# Patient Record
(demographics unavailable — no encounter records)

---

## 2025-05-30 NOTE — ASSESSMENT
[FreeTextEntry1] : This is a 69 y/o F (Slovak/Kinyarwanda speaking) with PMH of CAD s/p STEMI with COOPER (mLAD 100% obstruction 05/14/25) requiring IABP (for hypotension and hypoxia), HFrEF (LVEF 40-45% TTE 05/17/2025), tobacco use disorder (current smoker), COPD, HTN, HLD, T2DM, breast ca s/p lumpectomy 3 yrs ago who is presenting for initial cardiology visit after [facility] admission with d/c 05/19/25. Patient will be going back to [facility] where she will continue to get the rest of her care.   Will continue to uptitrate GDMT to maximally tolerated doses and will have patient follow-up with cardiology prior to trip back to [facility] where she will get the rest of her care.   #CAD s/p COOPER (LAD) Requiring IABP: #HFrEF (LVEF 40-45% TTE 05/17/2025): - TTE 05/17/25 with LVEF 40-45% and rWMAs with mid anteroseptal, inferior septum, basal anteroseptum, and apex abnormal.  [] Cont ASA 81mg daily [] Cont Plavix 75mg daily for 12 months (end 05/2026)- emphasized importance of not missing even one dose of DAPT  [] Cont losartan 25mg daily-> can consider increasing next visit  [] Increase metop succ to 50mg daily 05/29/25 (previously on 25mg daily) [] Cont spironolactone 25mg daily [] Increase dapagliflozin 5mg to 10mg for HFrEF 05/29/25 (pt previously already on 5mg from [facility])  [] F/u with Cardiology in 1 month (06/2025); will email letter to daughter stating pt has health condition and needs to delay flight home [] Next TTE in 3 months from 05/2025 when she gets home to [facility]  [] Pt will plan for cardiac rehab when back in [facility]  #HTN: [] Cont losartan, spironolactone, and metop as above  #HLD:  - Lipid panel 05/14/25: (H), HDL 46(L), Triglyc 154, Tot Chol 177 [] Cont atorva 80mg daily  #T2DM:  [] Mgmt as per PCP, on insulin and Ozempic  #Tobacco Use Disorder:  [] Encourage smoking cessation   Patient discussed with Dr. Matthews. F/u visit in 1 month. Cont to uptitrate GDMT and monitor for symptoms.  Beka Nelsno MD  Cardiology Fellow

## 2025-05-30 NOTE — ASSESSMENT
[FreeTextEntry1] : This is a 71 y/o F (Frisian/Syriac speaking) with PMH of CAD s/p STEMI with COOPER (mLAD 100% obstruction 05/14/25) requiring IABP (for hypotension and hypoxia), HFrEF (LVEF 40-45% TTE 05/17/2025), tobacco use disorder (current smoker), COPD, HTN, HLD, T2DM, breast ca s/p lumpectomy 3 yrs ago who is presenting for initial cardiology visit after [facility] admission with d/c 05/19/25. Patient will be going back to [facility] where she will continue to get the rest of her care.   Will continue to uptitrate GDMT to maximally tolerated doses and will have patient follow-up with cardiology prior to trip back to [facility] where she will get the rest of her care.   #CAD s/p COOPER (LAD) Requiring IABP: #HFrEF (LVEF 40-45% TTE 05/17/2025): - TTE 05/17/25 with LVEF 40-45% and rWMAs with mid anteroseptal, inferior septum, basal anteroseptum, and apex abnormal.  [] Cont ASA 81mg daily [] Cont Plavix 75mg daily for 12 months (end 05/2026)- emphasized importance of not missing even one dose of DAPT  [] Cont losartan 25mg daily-> can consider increasing next visit  [] Increase metop succ to 50mg daily 05/29/25 (previously on 25mg daily) [] Cont spironolactone 25mg daily [] Increase dapagliflozin 5mg to 10mg for HFrEF 05/29/25 (pt previously already on 5mg from [facility])  [] F/u with Cardiology in 1 month (06/2025); will email letter to daughter stating pt has health condition and needs to delay flight home [] Next TTE in 3 months from 05/2025 when she gets home to [facility]  [] Pt will plan for cardiac rehab when back in [facility]  #HTN: [] Cont losartan, spironolactone, and metop as above  #HLD:  - Lipid panel 05/14/25: (H), HDL 46(L), Triglyc 154, Tot Chol 177 [] Cont atorva 80mg daily  #T2DM:  [] Mgmt as per PCP, on insulin and Ozempic  #Tobacco Use Disorder:  [] Encourage smoking cessation   Patient discussed with Dr. Matthews. F/u visit in 1 month. Cont to uptitrate GDMT and monitor for symptoms.  Beka Nelson MD  Cardiology Fellow

## 2025-05-30 NOTE — CARDIOLOGY SUMMARY
[de-identified] : 05/29/25: TWI V2-V3, q waves inferiorly [de-identified] : TTE 05/17/25 with LVEF 40-45% and rWMAs with mid anteroseptal, inferior septum, basal anteroseptum, and apex abnormal. [de-identified] : 05/14/25 with COOPER mLAD 100% and requiring IABP

## 2025-05-30 NOTE — CARDIOLOGY SUMMARY
[de-identified] : 05/29/25: TWI V2-V3, q waves inferiorly [de-identified] : TTE 05/17/25 with LVEF 40-45% and rWMAs with mid anteroseptal, inferior septum, basal anteroseptum, and apex abnormal. [de-identified] : 05/14/25 with COOPER mLAD 100% and requiring IABP

## 2025-05-30 NOTE — ASSESSMENT
[FreeTextEntry1] : This is a 71 y/o F (Welsh/Irish speaking) with PMH of CAD s/p STEMI with COOPER (mLAD 100% obstruction 05/14/25) requiring IABP (for hypotension and hypoxia), HFrEF (LVEF 40-45% TTE 05/17/2025), tobacco use disorder (current smoker), COPD, HTN, HLD, T2DM, breast ca s/p lumpectomy 3 yrs ago who is presenting for initial cardiology visit after [facility] admission with d/c 05/19/25. Patient will be going back to [facility] where she will continue to get the rest of her care.   Will continue to uptitrate GDMT to maximally tolerated doses and will have patient follow-up with cardiology prior to trip back to [facility] where she will get the rest of her care.   #CAD s/p COOPER (LAD) Requiring IABP: #HFrEF (LVEF 40-45% TTE 05/17/2025): - TTE 05/17/25 with LVEF 40-45% and rWMAs with mid anteroseptal, inferior septum, basal anteroseptum, and apex abnormal.  [] Cont ASA 81mg daily [] Cont Plavix 75mg daily for 12 months (end 05/2026)- emphasized importance of not missing even one dose of DAPT  [] Cont losartan 25mg daily-> can consider increasing next visit  [] Increase metop succ to 50mg daily 05/29/25 (previously on 25mg daily) [] Cont spironolactone 25mg daily [] Increase dapagliflozin 5mg to 10mg for HFrEF 05/29/25 (pt previously already on 5mg from [facility])  [] F/u with Cardiology in 1 month (06/2025); will email letter to daughter stating pt has health condition and needs to delay flight home [] Next TTE in 3 months from 05/2025 when she gets home to [facility]  [] Pt will plan for cardiac rehab when back in [facility]  #HTN: [] Cont losartan, spironolactone, and metop as above  #HLD:  - Lipid panel 05/14/25: (H), HDL 46(L), Triglyc 154, Tot Chol 177 [] Cont atorva 80mg daily  #T2DM:  [] Mgmt as per PCP, on insulin and Ozempic  #Tobacco Use Disorder:  [] Encourage smoking cessation   Patient discussed with Dr. Matthews. F/u visit in 1 month. Cont to uptitrate GDMT and monitor for symptoms.  Beka Nelson MD  Cardiology Fellow

## 2025-05-30 NOTE — HISTORY OF PRESENT ILLNESS
[FreeTextEntry1] : This is a 71 y/o F (Swedish/Chinese speaking) with PMH of CAD s/p STEMI with COOPER (mLAD 100% obstruction 05/14/25) requiring IABP (for hypotension and hypoxia), HFrEF (LVEF 40-45% TTE 05/17/2025), tobacco use disorder (current smoker), COPD, HTN, HLD, T2DM, breast ca s/p lumpectomy 3 yrs ago who is presenting for initial cardiology [facility]. Patient will be going back to [facility] where she will continue to get the rest of her care.   05/29/25:  - Pt present with daughter and  - Doing well since STEMI, says has intermittent chest tightness that resolves on its own, 5/10 in severity. Has been walking and exercising. She walks in backyard few rounds but otherwise no significant exercise. Gets fatigued. Discussed importance of exercise and movement for rehabilitation - Compliant with all medications  - Lives in [facility], visiting her daughter; was planning to go back 06/09/2025 but pt will stay another month at least to monitor symptoms and to f/u with us in cardiology - Discussed importance of visiting cardiologist when she does go back; she has a doctor she will schedule f/u with  - Uptitrate GDMT; increased dapagliflozin to 10mg and metop succ 50mg from 25mg prior - Discuss tobacco cessation; working on quitting using patches that she has - Wants a letter for her flight company to delay her trip back  Last Labs:  CBC 05/19/25: WBC 7, Hgb 14.8, Plt 289 CMP: K 3.6, Mg 1.9, BUN 33, Cr 0.70, ALT 35(H), AST 19, Alk Phos 112

## 2025-05-30 NOTE — CARDIOLOGY SUMMARY
[de-identified] : 05/29/25: TWI V2-V3, q waves inferiorly [de-identified] : TTE 05/17/25 with LVEF 40-45% and rWMAs with mid anteroseptal, inferior septum, basal anteroseptum, and apex abnormal. [de-identified] : 05/14/25 with COOPER mLAD 100% and requiring IABP

## 2025-05-30 NOTE — HISTORY OF PRESENT ILLNESS
[FreeTextEntry1] : This is a 71 y/o F (English/French speaking) with PMH of CAD s/p STEMI with COOPER (mLAD 100% obstruction 05/14/25) requiring IABP (for hypotension and hypoxia), HFrEF (LVEF 40-45% TTE 05/17/2025), tobacco use disorder (current smoker), COPD, HTN, HLD, T2DM, breast ca s/p lumpectomy 3 yrs ago who is [facility] Patient will be going back to [facility] where she will continue to get the rest of her care.   05/29/25:  - Pt present with daughter and  - Doing well since STEMI, says has intermittent chest tightness that resolves on its own, 5/10 in severity. Has been walking and exercising. She walks in backyard few rounds but otherwise no significant exercise. Gets fatigued. Discussed importance of exercise and movement for rehabilitation - Compliant with all medications  - Lives in [facility], visiting her daughter; was planning to go back 06/09/2025 but pt will stay another month at least to monitor symptoms and to f/u with us in cardiology - Discussed importance of visiting cardiologist when she does go back; she has a doctor she will schedule f/u with  - Uptitrate GDMT; increased dapagliflozin to 10mg and metop succ 50mg from 25mg prior - Discuss tobacco cessation; working on quitting using patches that she has - Wants a letter for her flight company to delay her trip back  Last Labs:  CBC 05/19/25: WBC 7, Hgb 14.8, Plt 289 CMP: K 3.6, Mg 1.9, BUN 33, Cr 0.70, ALT 35(H), AST 19, Alk Phos 112

## 2025-05-30 NOTE — HISTORY OF PRESENT ILLNESS
[FreeTextEntry1] : This is a 69 y/o F (Kyrgyz/Spanish speaking) with PMH of CAD s/p STEMI with COOPER (mLAD 100% obstruction 05/14/25) requiring IABP (for hypotension and hypoxia), HFrEF (LVEF 40-45% TTE 05/17/2025), tobacco use disorder (current smoker), COPD, HTN, HLD, T2DM, breast ca s/p lumpectomy 3 yrs ago who is presenting [facility]. Patient will be going back to [facility] where she will continue to get the rest of her care.   05/29/25:  - Pt present with daughter and  - Doing well since STEMI, says has intermittent chest tightness that resolves on its own, 5/10 in severity. Has been walking and exercising. She walks in backyard few rounds but otherwise no significant exercise. Gets fatigued. Discussed importance of exercise and movement for rehabilitation - Compliant with all medications  - Lives in [facility], visiting her daughter; was planning to go back 06/09/2025 but pt will stay another month at least to monitor symptoms and to f/u with us in cardiology - Discussed importance of visiting cardiologist when she does go back; she has a doctor she will schedule f/u with  - Uptitrate GDMT; increased dapagliflozin to 10mg and metop succ 50mg from 25mg prior - Discuss tobacco cessation; working on quitting using patches that she has - Wants a letter for her flight company to delay her trip back  Last Labs:  CBC 05/19/25: WBC 7, Hgb 14.8, Plt 289 CMP: K 3.6, Mg 1.9, BUN 33, Cr 0.70, ALT 35(H), AST 19, Alk Phos 112

## 2025-05-30 NOTE — HISTORY OF PRESENT ILLNESS
[FreeTextEntry1] : This is a 71 y/o F (Slovenian/Luxembourgish speaking) with PMH of CAD s/p STEMI with COOPER (mLAD 100% obstruction 05/14/25) requiring IABP (for hypotension and hypoxia), HFrEF (LVEF 40-45% TTE 05/17/2025), tobacco use disorder (current smoker), COPD, HTN, HLD, T2DM, breast ca s/p lumpectomy 3 yrs ago who is presenting for [facility]. Patient will be going back to [facility] where she will continue to get the rest of her care.   05/29/25:  - Pt present with daughter and  - Doing well since STEMI, says has intermittent chest tightness that resolves on its own, 5/10 in severity. Has been walking and exercising. She walks in backyard few rounds but otherwise no significant exercise. Gets fatigued. Discussed importance of exercise and movement for rehabilitation - Compliant with all medications  - Lives in [facility], visiting her daughter; was planning to go back 06/09/2025 but pt will stay another month at least to monitor symptoms and to f/u with us in cardiology - Discussed importance of visiting cardiologist when she does go back; she has a doctor she will schedule f/u with  - Uptitrate GDMT; increased dapagliflozin to 10mg and metop succ 50mg from 25mg prior - Discuss tobacco cessation; working on quitting using patches that she has - Wants a letter for her flight company to delay her trip back  Last Labs:  CBC 05/19/25: WBC 7, Hgb 14.8, Plt 289 CMP: K 3.6, Mg 1.9, BUN 33, Cr 0.70, ALT 35(H), AST 19, Alk Phos 112

## 2025-05-30 NOTE — ASSESSMENT
[FreeTextEntry1] : This is a 71 y/o F (Japanese/Yi speaking) with PMH of CAD s/p STEMI with COOPER (mLAD 100% obstruction 05/14/25) requiring IABP (for hypotension and hypoxia), HFrEF (LVEF 40-45% TTE 05/17/2025), tobacco use disorder (current smoker), COPD, HTN, HLD, T2DM, breast ca s/p lumpectomy 3 yrs ago who is presenting for initial cardiology visit after [facility] admission with d/c 05/19/25. Patient will be going back to [facility] where she will continue to get the rest of her care.   Will continue to uptitrate GDMT to maximally tolerated doses and will have patient follow-up with cardiology prior to trip back to [facility] where she will get the rest of her care.   #CAD s/p COOPER (LAD) Requiring IABP: #HFrEF (LVEF 40-45% TTE 05/17/2025): - TTE 05/17/25 with LVEF 40-45% and rWMAs with mid anteroseptal, inferior septum, basal anteroseptum, and apex abnormal.  [] Cont ASA 81mg daily [] Cont Plavix 75mg daily for 12 months (end 05/2026)- emphasized importance of not missing even one dose of DAPT  [] Cont losartan 25mg daily-> can consider increasing next visit  [] Increase metop succ to 50mg daily 05/29/25 (previously on 25mg daily) [] Cont spironolactone 25mg daily [] Increase dapagliflozin 5mg to 10mg for HFrEF 05/29/25 (pt previously already on 5mg from [facility])  [] F/u with Cardiology in 1 month (06/2025); will email letter to daughter stating pt has health condition and needs to delay flight home [] Next TTE in 3 months from 05/2025 when she gets home to [facility]  [] Pt will plan for cardiac rehab when back in [facility]  #HTN: [] Cont losartan, spironolactone, and metop as above  #HLD:  - Lipid panel 05/14/25: (H), HDL 46(L), Triglyc 154, Tot Chol 177 [] Cont atorva 80mg daily  #T2DM:  [] Mgmt as per PCP, on insulin and Ozempic  #Tobacco Use Disorder:  [] Encourage smoking cessation   Patient discussed with Dr. Matthews. F/u visit in 1 month. Cont to uptitrate GDMT and monitor for symptoms.  Beka Nelson MD  Cardiology Fellow

## 2025-05-30 NOTE — CARDIOLOGY SUMMARY
[de-identified] : 05/29/25: TWI V2-V3, q waves inferiorly [de-identified] : TTE 05/17/25 with LVEF 40-45% and rWMAs with mid anteroseptal, inferior septum, basal anteroseptum, and apex abnormal. [de-identified] : 05/14/25 with COOPER mLAD 100% and requiring IABP

## 2025-05-30 NOTE — PHYSICAL EXAM
[Well Developed] : well developed [Well Nourished] : well nourished [No Acute Distress] : no acute distress [Normal S1, S2] : normal S1, S2 [No Murmur] : no murmur [No Rub] : no rub [No Gallop] : no gallop [Clear Lung Fields] : clear lung fields [Good Air Entry] : good air entry [No Respiratory Distress] : no respiratory distress  [Soft] : abdomen soft [Non Tender] : non-tender [Normal Gait] : normal gait [No Edema] : no edema [No Cyanosis] : no cyanosis [Moves all extremities] : moves all extremities [Alert and Oriented] : alert and oriented [Normal memory] : normal memory

## 2025-06-10 NOTE — HISTORY OF PRESENT ILLNESS
[FreeTextEntry2] : 69 y/o F (Uzbek/Mexican speaking) with PMH of CAD s/p STEMI with COOPER (mLAD 100% obstruction 05/14/25) requiring IABP (for hypotension and hypoxia), HFrEF, tobacco use disorder (current smoker), COPD, HTN, HLD, T2DM, breast ca s/p lumpectomy 3 yrs ago here for post discharge appt. While inpatient, was on basal bolus insulin, stent placed, GDMT started, and discharged after CICU stay for IABP after cath. Patient seen by cards and recently uptitrated on her metoprolol and farxiga. Has been compliant with all meds including DAPT. Plan is to return to [place] for remainder of care but will hold off for 1 month to recuperate.  She does still exercise somewhat walking around the backyard, but has not yet started cardiac rehab. She had occasional chest tightness that has now resolved after uptitrating metoprolol.  Seemingly acute stress disorder after her ICU stay. Flashbacks and nightmares. Also with seemingly panic attacks whenever her family leaves her alone related to her heart attack. She becomes extremely worried and has new emotional disturbance with small triggers. Not sleeping well. No sweating or palpitations. Has not used the Temesta (lorazepam) that she has been prescribed in [place] yet.  Doesn't notice when her glucose is low but it has been low outside of the hospital based on her European glucometer (also freestyle). Takes 44U of Tresiba at night and mealtime bolus insulin with novolog. Dropped into the 3s meaning around 50s on US scale throughout the night and during day for the past week. Will stop taking mealtime insulin if BZ[mmol] is less than 5 or glucose < 90.  Med List: aspirin 81 mg oral delayed release tablet: 1 tab(s) orally once a day atorvastatin 80 mg oral tablet: 1 tab(s) orally once a day (at bedtime) clopidogrel 75 mg oral tablet: 1 tab(s) orally once a day Changed: Farxiga 10 mg oral tablet: 1 tab(s) orally once a day furosemide 40 mg oral tablet: 1 tab(s) orally once a day letrozole 2.5 mg oral tablet: 1 tab(s) orally once a day losartan 25 mg oral tablet: 1 tab(s) orally once a day New: metoprolol succinate 50 mg oral tablet, extended release: 1 tab(s) orally once a day omeprazole 20 mg oral delayed release tablet: 1 tab(s) orally once a day Ozempic 4 mg/3 mL (1 mg dose) subcutaneous solution: 1 milligram(s) subcutaneously once a week pantoprazole 40 mg oral delayed release tablet: 1 tab(s) orally once a day (before a meal) Tresiba FlexTouch 44U daily Novorapid is on a scale depending on glucose.

## 2025-06-10 NOTE — PHYSICAL EXAM
[No Acute Distress] : no acute distress [EOMI] : extraocular movements intact [Normal Oropharynx] : the oropharynx was normal [Supple] : supple [No Respiratory Distress] : no respiratory distress  [No Accessory Muscle Use] : no accessory muscle use [Clear to Auscultation] : lungs were clear to auscultation bilaterally [Normal Rate] : normal rate  [Regular Rhythm] : with a regular rhythm [Normal S1, S2] : normal S1 and S2 [Soft] : abdomen soft [Non Tender] : non-tender [No Joint Swelling] : no joint swelling [Grossly Normal Strength/Tone] : grossly normal strength/tone [No Rash] : no rash [No Focal Deficits] : no focal deficits [Normal Gait] : normal gait [Normal Insight/Judgement] : insight and judgment were intact [de-identified] : Tearful [de-identified] : Tearful [de-identified] : 1+ edema to the shins

## 2025-06-10 NOTE — PHYSICAL EXAM
[No Acute Distress] : no acute distress [EOMI] : extraocular movements intact [Normal Oropharynx] : the oropharynx was normal [Supple] : supple [No Respiratory Distress] : no respiratory distress  [No Accessory Muscle Use] : no accessory muscle use [Clear to Auscultation] : lungs were clear to auscultation bilaterally [Normal Rate] : normal rate  [Regular Rhythm] : with a regular rhythm [Normal S1, S2] : normal S1 and S2 [Soft] : abdomen soft [Non Tender] : non-tender [No Joint Swelling] : no joint swelling [Grossly Normal Strength/Tone] : grossly normal strength/tone [No Rash] : no rash [No Focal Deficits] : no focal deficits [Normal Gait] : normal gait [Normal Insight/Judgement] : insight and judgment were intact [de-identified] : Tearful [de-identified] : 1+ edema to the shins [de-identified] : Tearful

## 2025-06-10 NOTE — ASSESSMENT
[FreeTextEntry1] : RTC in 2 weeks for f/u hypoglycemia and insulin titration. If still in country, f/u 5 weeks for remainder of screening.  Case discussed with Dr. Davi Bates, PGY-2

## 2025-06-10 NOTE — HISTORY OF PRESENT ILLNESS
[FreeTextEntry2] : 71 y/o F (Yoruba/Cuban speaking) with PMH of CAD s/p STEMI with COOPER (mLAD 100% obstruction 05/14/25) requiring IABP (for hypotension and hypoxia), HFrEF, tobacco use disorder (current smoker), COPD, HTN, HLD, T2DM, breast ca s/p lumpectomy 3 yrs ago here for post discharge appt. While inpatient, was on basal bolus insulin, stent placed, GDMT started, and discharged after CICU stay for IABP after cath. Patient seen by cards and recently uptitrated on her metoprolol and farxiga. Has been compliant with all meds including DAPT. Plan is to return to [place] for remainder of care but will hold off for 1 month to recuperate.  She does still exercise somewhat walking around the backyard, but has not yet started cardiac rehab. She had occasional chest tightness that has now resolved after uptitrating metoprolol.  Seemingly acute stress disorder after her ICU stay. Flashbacks and nightmares. Also with seemingly panic attacks whenever her family leaves her alone related to her heart attack. She becomes extremely worried and has new emotional disturbance with small triggers. Not sleeping well. No sweating or palpitations. Has not used the Temesta (lorazepam) that she has been prescribed in [place] yet.  Doesn't notice when her glucose is low but it has been low outside of the hospital based on her European glucometer (also freestyle). Takes 44U of Tresiba at night and mealtime bolus insulin with novolog. Dropped into the 3s meaning around 50s on US scale throughout the night and during day for the past week. Will stop taking mealtime insulin if BZ[mmol] is less than 5 or glucose < 90.  Med List: aspirin 81 mg oral delayed release tablet: 1 tab(s) orally once a day atorvastatin 80 mg oral tablet: 1 tab(s) orally once a day (at bedtime) clopidogrel 75 mg oral tablet: 1 tab(s) orally once a day Changed: Farxiga 10 mg oral tablet: 1 tab(s) orally once a day furosemide 40 mg oral tablet: 1 tab(s) orally once a day letrozole 2.5 mg oral tablet: 1 tab(s) orally once a day losartan 25 mg oral tablet: 1 tab(s) orally once a day New: metoprolol succinate 50 mg oral tablet, extended release: 1 tab(s) orally once a day omeprazole 20 mg oral delayed release tablet: 1 tab(s) orally once a day Ozempic 4 mg/3 mL (1 mg dose) subcutaneous solution: 1 milligram(s) subcutaneously once a week pantoprazole 40 mg oral delayed release tablet: 1 tab(s) orally once a day (before a meal) Tresiba FlexTouch 44U daily Novorapid is on a scale depending on glucose.

## 2025-06-10 NOTE — END OF VISIT
[] : Resident [FreeTextEntry3] : recent hospital admission for STEMI while here visiting from [facility]. continue cardiac regimen including DAPT for diabetes, a1c above goal, but also with hypoglycemia. we are titrating insulin a bit today. can continue other meds that pt has from [facility]. If needed, can enroll in [facility] and get meds through vivo. May need trulicity in place of ozempic.

## 2025-06-10 NOTE — END OF VISIT
[] : Resident [FreeTextEntry3] : recent hospital admission for STEMI while here visiting from [facility]. continue cardiac regimen including DAPT for diabetes, a1c above goal, but also with hypoglycemia. we are titrating insulin a bit today. can continue other meds that pt has from [facility]. If needed, can enroll in EF[facility] and get meds through vivo. May need trulicity in place of ozempic.

## 2025-06-20 NOTE — PHYSICAL EXAM
[No Acute Distress] : no acute distress [Well Nourished] : well nourished [Well Developed] : well developed [Well-Appearing] : well-appearing [Normal Sclera/Conjunctiva] : normal sclera/conjunctiva [EOMI] : extraocular movements intact [Normal Outer Ear/Nose] : the outer ears and nose were normal in appearance [No Respiratory Distress] : no respiratory distress  [No Accessory Muscle Use] : no accessory muscle use [Coordination Grossly Intact] : coordination grossly intact [Normal Affect] : the affect was normal [Normal Insight/Judgement] : insight and judgment were intact

## 2025-06-24 NOTE — HISTORY OF PRESENT ILLNESS
[FreeTextEntry1] : F/u DM, hypoglycemia  [de-identified] : Pt is a 69 y/o F (Tajik/Thai speaking) with PMH of CAD s/p STEMI with COOPER (mLAD 100% obstruction 05/14/25) requiring IABP (for hypotension and hypoxia), HFrEF, tobacco use disorder (current smoker), COPD, HTN, HLD, T2DM, breast ca s/p lumpectomy 3 yrs ago here for follow up of her diabetes.  Pt examined virtually via telehealth. Pt attended the appointment with her daughter from her daughter's home in [place]. Provider at [facility]. Pt consented and amenable to telehealth visit for this appointment.   DM - Pt compliant with novalin and tresiba. Also on Ozempic and farxiga (GDMT for CHF). CGM  based, reports very few hypoglycemia episodes (< 5). Pt reports 1 instance during which she was asymptomatic and treated with a sugar pill she also got from the [place]. Reports she takes 2 units when CGM < 5, 5 units when 5-7 and 14 units when above 7. Taking 30 units of Tresiba nightly. Complaint with all medications, but daughter reports some confusion with sliding scale dosage. Pt had a syncopal episode when standing from sitting in a chair. Reports improvement in 1-2 minutes without consumption of sugar. Was not able to measure BP or glucose at that time.

## 2025-06-24 NOTE — HISTORY OF PRESENT ILLNESS
[FreeTextEntry1] : F/u DM, hypoglycemia  [de-identified] : Pt is a 71 y/o F (Lithuanian/Divehi speaking) with PMH of CAD s/p STEMI with COOPER (mLAD 100% obstruction 05/14/25) requiring IABP (for hypotension and hypoxia), HFrEF, tobacco use disorder (current smoker), COPD, HTN, HLD, T2DM, breast ca s/p lumpectomy 3 yrs ago here for follow up of her diabetes.  Pt examined virtually via telehealth. Pt attended the appointment with her daughter from her daughter's home in [place]. Provider at [facility]. Pt consented and amenable to telehealth visit for this appointment.   DM - Pt compliant with novalin and tresiba. Also on Ozempic and farxiga (GDMT for CHF). CGM  based, reports very few hypoglycemia episodes (< 5). Pt reports 1 instance during which she was asymptomatic and treated with a sugar pill she also got from the [place]. Reports she takes 2 units when CGM < 5, 5 units when 5-7 and 14 units when above 7. Taking 30 units of Tresiba nightly. Complaint with all medications, but daughter reports some confusion with sliding scale dosage. Pt had a syncopal episode when standing from sitting in a chair. Reports improvement in 1-2 minutes without consumption of sugar. Was not able to measure BP or glucose at that time.

## 2025-06-24 NOTE — ASSESSMENT
[FreeTextEntry1] : #T2DM  - Pt with only 1 episode of CGM reading < 5 during the last 2 weeks - On Ozempic 1 mg q weekly, farxiga, Tresiba 30 units nightly and Novolin 2 units < 5, 8 units if 5-7 and 14 units if greater than 7, compliant with all medications  - A1c inpatient 8.5% - Pt recommended to decrease novolin usage due to one instance of hypoglycemia - Sliding scale as follows: no insulin if reading less than 5, 2 units if between 5-7 reading, 8+ can do anywhere from 8-10 units based on amount of food eaten, etc  - Pt/daughter expressed understanding of this change  - Continue ozempic 1mg weekly - Cards apt next week   #Syncope  - Diff: less likely hypoglycemia vs postural hypotension (most likely) vs vasovagal  - Most likely postural given syncope occurred immediately after standing abruptly from sitting, but also possible to be vasovagal due to claustrophobia in a small apartment during the time of these events  - BP tightly controlled with 3 agents, mostly 130s/70s at this time  - Less likely to be hypoglycemia mediated due to improvement in symptoms w/o consumption of glucose - Recommended to sit up cautiously to give autonomic system time to calibrate in the future - Seek medical care if syncopal episode repeats

## 2025-06-24 NOTE — INTERPRETER SERVICES
[Ad Hoc ] : provided by an ad hoc  [Interpreters_Relationshiptopatient] : Pt's daughter  [TWNoteComboBox1] : Thai

## 2025-06-27 NOTE — PHYSICAL EXAM
[Normal] : soft, non-tender, no masses/organomegaly, normal bowel sounds [de-identified] : ankle edema

## 2025-06-27 NOTE — REASON FOR VISIT
[FreeTextEntry1] : 71 y/o F (Tajik/Finnish speaking) with PMH of CAD s/p STEMI with COOPER (mLAD 100% obstruction 05/14/25) requiring IABP (for hypotension and hypoxia), HFrEF (LVEF 40-45% TTE 05/17/2025), tobacco use disorder (current smoker), COPD, HTN, HLD, T2DM, breast ca s/p lumpectomy 3 yrs ago who is presenting for f/u. Lives in [facility].   Previous visit discussed smoking cessation.   This time notes that had a syncopal event 2 weeks ago, preceded by prodrome of flushing and dizziness, as well as just eaten a big meal. Fell and passed out for a few seconds, then came to, went to the bathroom, felt better. Otherwise denies SOB, CP, palpitations. No orthopnea, no weight gain.  Echo: TTE 05/17/25 with LVEF 40-45% and rWMAs with mid anteroseptal, inferior septum, basal anteroseptum, and apex abnormal.   Cardiac Cath/PCI: 05/14/25 with COOPER mLAD 100% and requiring IABP  EKG: SR with PVCs (4 PVCs on page)

## 2025-06-27 NOTE — PHYSICAL EXAM
[Normal] : soft, non-tender, no masses/organomegaly, normal bowel sounds [de-identified] : ankle edema

## 2025-06-27 NOTE — ASSESSMENT
[FreeTextEntry1] : 71 y/o F (French/Tunisian speaking) with PMH of CAD s/p STEMI with COOPER (mLAD 100% obstruction 05/14/25) requiring IABP (for hypotension and hypoxia), HFrEF (LVEF 40-45% TTE 05/17/2025), tobacco use disorder (current smoker), COPD, HTN, HLD, T2DM, breast ca s/p lumpectomy 3 yrs ago who is presenting for f/u.   #CAD s/p COOPER (LAD) Requiring IABP: #HFrEF (LVEF 40-45% TTE 05/17/2025): - TTE 05/17/25 with LVEF 40-45% and rWMAs with mid anteroseptal, inferior septum, basal anteroseptum, and apex abnormal. -c/w ASA, plavix -c/w losart 25, roger 25, dapa 10 -increase toprol to 100mg given PVCs -c/w atorva 80 -counselled that patient needs a zio AT given PVCs and syncopal story (although sounds more vagally mediated), as well as risk of SCD/VT. Patient aware, states she is going back to [facility] in 1 week and does not want to delay flight any further, understands the risks, has a cardiologist appt in [facility] scheduled already -checking labs to rule out electrolyte abnormalities  Update: Labs back with elevated potassium to 5.7. Called patient and left voicemail instructing to stop spironolactone and repeat blood work in 1-2 weeks. Advised patient if having syncope/palpitations to go to ED.

## 2025-06-27 NOTE — ASSESSMENT
[FreeTextEntry1] : 69 y/o F (Azeri/Chinese speaking) with PMH of CAD s/p STEMI with COOPER (mLAD 100% obstruction 05/14/25) requiring IABP (for hypotension and hypoxia), HFrEF (LVEF 40-45% TTE 05/17/2025), tobacco use disorder (current smoker), COPD, HTN, HLD, T2DM, breast ca s/p lumpectomy 3 yrs ago who is presenting for f/u.   #CAD s/p COOPER (LAD) Requiring IABP: #HFrEF (LVEF 40-45% TTE 05/17/2025): - TTE 05/17/25 with LVEF 40-45% and rWMAs with mid anteroseptal, inferior septum, basal anteroseptum, and apex abnormal. -c/w ASA, plavix -c/w losart 25, roger 25, dapa 10 -increase toprol to 100mg given PVCs -c/w atorva 80 -counselled that patient needs a zio AT given PVCs and syncopal story (although sounds more vagally mediated), as well as risk of SCD/VT. Patient aware, states she is going back to [facility] in 1 week and does not want to delay flight any further, understands the risks, has a cardiologist appt in [facility] scheduled already -checking labs to rule out electrolyte abnormalities  Update: Labs back with elevated potassium to 5.7. Called patient and left voicemail instructing to stop spironolactone and repeat blood work in 1-2 weeks. Advised patient if having syncope/palpitations to go to ED.

## 2025-06-27 NOTE — PHYSICAL EXAM
[Normal] : soft, non-tender, no masses/organomegaly, normal bowel sounds [de-identified] : ankle edema

## 2025-06-27 NOTE — REASON FOR VISIT
[FreeTextEntry1] : 71 y/o F (Greenlandic/Azerbaijani speaking) with PMH of CAD s/p STEMI with COOPER (mLAD 100% obstruction 05/14/25) requiring IABP (for hypotension and hypoxia), HFrEF (LVEF 40-45% TTE 05/17/2025), tobacco use disorder (current smoker), COPD, HTN, HLD, T2DM, breast ca s/p lumpectomy 3 yrs ago who is presenting for f/u. Lives in [facility].   Previous visit discussed smoking cessation.   This time notes that had a syncopal event 2 weeks ago, preceded by prodrome of flushing and dizziness, as well as just eaten a big meal. Fell and passed out for a few seconds, then came to, went to the bathroom, felt better. Otherwise denies SOB, CP, palpitations. No orthopnea, no weight gain.  Echo: TTE 05/17/25 with LVEF 40-45% and rWMAs with mid anteroseptal, inferior septum, basal anteroseptum, and apex abnormal.   Cardiac Cath/PCI: 05/14/25 with COOPER mLAD 100% and requiring IABP  EKG: SR with PVCs (4 PVCs on page)

## 2025-06-27 NOTE — REASON FOR VISIT
[FreeTextEntry1] : 71 y/o F (Hebrew/Stateless speaking) with PMH of CAD s/p STEMI with COOPER (mLAD 100% obstruction 05/14/25) requiring IABP (for hypotension and hypoxia), HFrEF (LVEF 40-45% TTE 05/17/2025), tobacco use disorder (current smoker), COPD, HTN, HLD, T2DM, breast ca s/p lumpectomy 3 yrs ago who is presenting for f/u. Lives in [facility].   Previous visit discussed smoking cessation.   This time notes that had a syncopal event 2 weeks ago, preceded by prodrome of flushing and dizziness, as well as just eaten a big meal. Fell and passed out for a few seconds, then came to, went to the bathroom, felt better. Otherwise denies SOB, CP, palpitations. No orthopnea, no weight gain.  Echo: TTE 05/17/25 with LVEF 40-45% and rWMAs with mid anteroseptal, inferior septum, basal anteroseptum, and apex abnormal.   Cardiac Cath/PCI: 05/14/25 with COOPER mLAD 100% and requiring IABP  EKG: SR with PVCs (4 PVCs on page)

## 2025-06-27 NOTE — ASSESSMENT
[FreeTextEntry1] : 69 y/o F (Luxembourgish/Sri Lankan speaking) with PMH of CAD s/p STEMI with COOPER (mLAD 100% obstruction 05/14/25) requiring IABP (for hypotension and hypoxia), HFrEF (LVEF 40-45% TTE 05/17/2025), tobacco use disorder (current smoker), COPD, HTN, HLD, T2DM, breast ca s/p lumpectomy 3 yrs ago who is presenting for f/u.   #CAD s/p COOPER (LAD) Requiring IABP: #HFrEF (LVEF 40-45% TTE 05/17/2025): - TTE 05/17/25 with LVEF 40-45% and rWMAs with mid anteroseptal, inferior septum, basal anteroseptum, and apex abnormal. -c/w ASA, plavix -c/w losart 25, roger 25, dapa 10 -increase toprol to 100mg given PVCs -c/w atorva 80 -counselled that patient needs a zio AT given PVCs and syncopal story (although sounds more vagally mediated), as well as risk of SCD/VT. Patient aware, states she is going back to [facility] in 1 week and does not want to delay flight any further, understands the risks, has a cardiologist appt in [facility] scheduled already -checking labs to rule out electrolyte abnormalities  Update: Labs back with elevated potassium to 5.7. Called patient and left voicemail instructing to stop spironolactone and repeat blood work in 1-2 weeks. Advised patient if having syncope/palpitations to go to ED.